# Patient Record
Sex: MALE | Race: WHITE | Employment: FULL TIME | URBAN - METROPOLITAN AREA
[De-identification: names, ages, dates, MRNs, and addresses within clinical notes are randomized per-mention and may not be internally consistent; named-entity substitution may affect disease eponyms.]

---

## 2019-04-02 ENCOUNTER — OFFICE VISIT (OUTPATIENT)
Dept: ORTHOPEDIC SURGERY | Age: 19
End: 2019-04-02
Payer: COMMERCIAL

## 2019-04-02 VITALS
WEIGHT: 150 LBS | HEART RATE: 76 BPM | BODY MASS INDEX: 21.47 KG/M2 | DIASTOLIC BLOOD PRESSURE: 69 MMHG | SYSTOLIC BLOOD PRESSURE: 107 MMHG | HEIGHT: 70 IN

## 2019-04-02 DIAGNOSIS — M25.512 ACUTE PAIN OF LEFT SHOULDER: Primary | ICD-10-CM

## 2019-04-02 PROCEDURE — 99203 OFFICE O/P NEW LOW 30 MIN: CPT | Performed by: ORTHOPAEDIC SURGERY

## 2019-04-02 SDOH — HEALTH STABILITY: MENTAL HEALTH: HOW OFTEN DO YOU HAVE A DRINK CONTAINING ALCOHOL?: NEVER

## 2019-04-02 NOTE — PROGRESS NOTES
Subjective:      Patient ID: Aida Crespo is a 22 y.o. male.     HPI    Review of Systems    Objective:   Physical Exam    Assessment:            Plan:              Katty Chandler MA

## 2019-04-02 NOTE — LETTER
[address]  Phone: [phone]  Fax: [phone]    Deloris Sherman MD        April 2, 2019     Patient: Magan Bartlett   YOB: 2000   Date of Visit: 4/2/2019       To Whom It May Concern: It is my medical opinion that Magan Bartlett may return to work with no restrictions. If you have any questions or concerns, please don't hesitate to call.     Sincerely,        Deloris Sherman MD

## 2019-04-02 NOTE — PROGRESS NOTES
Date:  2019    Name:  Jonathon Juárez  Address:  [address] [place] [address],[address]  [place] [address]    :  2000      Age:   22 y.o.    SSN:  [id number]      Medical Record Number:  [id number]    Reason for Visit:    Chief Complaint    Shoulder Pain (WC left triceps)      DOS:2019     HPI: Jonathon Juárez is a pleasant 22 y.o. male here today for LEFT elbow pain. He was referred here today by both his dad and his brother who have both previously been treated by Dr. Balaji Walters. On 3/19/2019 Jonathon Juárez was climbing on scaffolding, one of the pins were loose and causing him to fall, scrapping his elbow on the scaffolding. Since this injury Guillermo Griffith has returned to work. He has gone back to working out but is avoiding exercises involving his triceps. His pain overall has been very minimal and he does not notice a significant decrease in strength or function. Pain Assessment  Location of Pain: Shoulder  Location Modifiers: Left  Severity of Pain: 0  Aggravating Factors: Exercise  Limiting Behavior: Yes  Result of Injury: Yes  Work-Related Injury: Yes  Are there other pain locations you wish to document?: No  ROS: All systems reviewed and otherwise negative. Pertinent items are noted in HPI. History reviewed. No pertinent past medical history. Past Surgical History:   Procedure Laterality Date    WRIST FRACTURE SURGERY Left        History reviewed. No pertinent family history.     Social History     Socioeconomic History    Marital status: Single     Spouse name: None    Number of children: None    Years of education: None    Highest education level: None   Occupational History    None   Social Needs    Financial resource strain: None    Food insecurity:     Worry: None     Inability: None    Transportation needs:     Medical: None     Non-medical: None   Tobacco Use    Smoking status: Never Smoker    Smokeless tobacco: Never Used   Substance and Sexual Activity    Alcohol use: Never Frequency: Never    Drug use: Never    Sexual activity: None   Lifestyle    Physical activity:     Days per week: None     Minutes per session: None    Stress: None   Relationships    Social connections:     Talks on phone: None     Gets together: None     Attends Mu-ism service: None     Active member of club or organization: None     Attends meetings of clubs or organizations: None     Relationship status: None    Intimate partner violence:     Fear of current or ex partner: None     Emotionally abused: None     Physically abused: None     Forced sexual activity: None   Other Topics Concern    None   Social History Narrative    None       No current outpatient medications on file. No current facility-administered medications for this visit. No Known Allergies    Vital signs:  /69   Pulse 76   Ht 5' 10\" (1.778 m)   Wt 150 lb (68 kg)   BMI 21.52 kg/m²        Neuro: Alert & oriented x 3,  normal,  no focal deficits noted. Normal affect. Eyes: sclera clear  Ears: Normal external ear  Mouth:  No perioral lesions  Pulm: Respirations unlabored and regular  Pulse: Regular rate and rhythm   Skin: Warm, well perfused    LEFT Elbow Examination:    Inspection:  No skin lesions, no deformity, no swelling. No muscle atrophy. Palpation:  No palpable gap. No tenderness to palpation over the mobile wad, flexor pronator mass, biceps, triceps. No tenderness over the ulnar collateral ligament and the lateral collateral ligament complex    Range of Motion: Flexion 150, extension 0, supination 85, pronation 85  4+/5 ext strength  5/5 flexion     Strength:  Normal strength of the wrist extensors and flexors, Elbow flexion 5/5, extension 4+/5.      Stability:  No instability noted to varus and valgus stress    Neurovascular: Palpable radial pulse, normal sensation in the median, ulnar, radial nerve distributions        Comparison RIGHT Elbow Examination:    Inspection:  No skin lesions, no deformity, no swelling    Palpation:  No tenderness to palpation over the lateral epicondyle, medial epicondyle, olecranon. No tenderness to palpation over the mobile wad, flexor pronator mass, biceps, triceps. No tenderness over the ulnar collateral ligament and the lateral collateral ligament complex    Range of Motion: full extension and able to touch her shoulders in flexion, normal supination and pronation with the elbow at 90°    Strength:  Normal strength of the wrist extensors and flexors, normal strength of the biceps and triceps    Stability:  No instability noted to varus and valgus stress, no rotatory instability with pushup test    Neurovascular: Palpable radial pulse, normal sensation in the median, ulnar, radial nerve distributions        Diagnostics:  Radiology:       MRI [facility] on 3/21/2019  Findings:   1- Focal tear in the posterior medial border of the long head of the triceps muscle at the level of the mid arm. Assessment: Jonathon Juárez is a pleasant 22 y.o. Male with superficial triceps muscle tear. Plan: Jonathon Juárez sustained an incomplete triceps muscle tear on 3/19/19. He will continue full duty at work. We advised that as he begins to ease back into working out, he should focus on high repetitions with lower weight vs heavy lifting. If he experiences soreness during work or lifting he may take ibuprofen PRN. We will see him back as needed. Jonathon Juárez is in agreement with plan and all questions were answered today. 4/2/2019  11:44 AM      Bruna Eduardo ATC  Orthopa[facility]     During this examination, IBruna ATC, functioned as a scribe for Dr. Dylan Elise. The history taking and physical examination were performed by Dr. Balaji Walters. All counsleing during the appointment was performed between the patient and Dr. Balaji Walters.  4/2/19    __________________  I, Dr. Dylan Elise, personally performed the services described in this documentation as described by Sanjay Akhtar ATC in my presence, and it is both accurate and complete. Lizzy Aceevdo MD, PhD  4/2/2019